# Patient Record
Sex: FEMALE | Race: OTHER
[De-identification: names, ages, dates, MRNs, and addresses within clinical notes are randomized per-mention and may not be internally consistent; named-entity substitution may affect disease eponyms.]

---

## 2018-06-26 ENCOUNTER — RESULT REVIEW (OUTPATIENT)
Age: 63
End: 2018-06-26

## 2019-04-27 ENCOUNTER — TRANSCRIPTION ENCOUNTER (OUTPATIENT)
Age: 64
End: 2019-04-27

## 2019-05-02 ENCOUNTER — RESULT REVIEW (OUTPATIENT)
Age: 64
End: 2019-05-02

## 2019-05-05 ENCOUNTER — TRANSCRIPTION ENCOUNTER (OUTPATIENT)
Age: 64
End: 2019-05-05

## 2019-05-19 ENCOUNTER — TRANSCRIPTION ENCOUNTER (OUTPATIENT)
Age: 64
End: 2019-05-19

## 2019-10-24 ENCOUNTER — FORM ENCOUNTER (OUTPATIENT)
Age: 64
End: 2019-10-24

## 2020-05-28 ENCOUNTER — FORM ENCOUNTER (OUTPATIENT)
Age: 65
End: 2020-05-28

## 2020-11-12 ENCOUNTER — FORM ENCOUNTER (OUTPATIENT)
Age: 65
End: 2020-11-12

## 2021-02-11 PROBLEM — Z00.00 ENCOUNTER FOR PREVENTIVE HEALTH EXAMINATION: Status: ACTIVE | Noted: 2021-02-11

## 2021-05-22 ENCOUNTER — TRANSCRIPTION ENCOUNTER (OUTPATIENT)
Age: 66
End: 2021-05-22

## 2021-05-28 ENCOUNTER — APPOINTMENT (OUTPATIENT)
Dept: BREAST CENTER | Facility: CLINIC | Age: 66
End: 2021-05-28

## 2021-06-14 ENCOUNTER — APPOINTMENT (OUTPATIENT)
Dept: BREAST CENTER | Facility: CLINIC | Age: 66
End: 2021-06-14
Payer: COMMERCIAL

## 2021-06-14 ENCOUNTER — NON-APPOINTMENT (OUTPATIENT)
Age: 66
End: 2021-06-14

## 2021-06-14 VITALS
DIASTOLIC BLOOD PRESSURE: 75 MMHG | OXYGEN SATURATION: 95 % | HEART RATE: 92 BPM | BODY MASS INDEX: 26.66 KG/M2 | SYSTOLIC BLOOD PRESSURE: 116 MMHG | HEIGHT: 65 IN | WEIGHT: 160 LBS

## 2021-06-14 DIAGNOSIS — Z78.9 OTHER SPECIFIED HEALTH STATUS: ICD-10-CM

## 2021-06-14 DIAGNOSIS — Z87.39 PERSONAL HISTORY OF OTHER DISEASES OF THE MUSCULOSKELETAL SYSTEM AND CONNECTIVE TISSUE: ICD-10-CM

## 2021-06-14 DIAGNOSIS — Z80.41 FAMILY HISTORY OF MALIGNANT NEOPLASM OF OVARY: ICD-10-CM

## 2021-06-14 DIAGNOSIS — Z80.3 FAMILY HISTORY OF MALIGNANT NEOPLASM OF BREAST: ICD-10-CM

## 2021-06-14 PROCEDURE — 99213 OFFICE O/P EST LOW 20 MIN: CPT

## 2021-06-14 PROCEDURE — 99072 ADDL SUPL MATRL&STAF TM PHE: CPT

## 2021-06-14 RX ORDER — ELECTROLYTES/DEXTROSE
SOLUTION, ORAL ORAL
Refills: 0 | Status: ACTIVE | COMMUNITY

## 2021-06-14 NOTE — PHYSICAL EXAM
[Normocephalic] : normocephalic [Atraumatic] : atraumatic [EOMI] : extra ocular movement intact [Supple] : supple [No Supraclavicular Adenopathy] : no supraclavicular adenopathy [No Cervical Adenopathy] : no cervical adenopathy [Examined in the supine and seated position] : examined in the supine and seated position [No dominant masses] : no dominant masses in right breast  [No dominant masses] : no dominant masses left breast [No Nipple Retraction] : no left nipple retraction [No Nipple Discharge] : no left nipple discharge [Breast Mass Right Breast ___cm] : no masses [Breast Mass Left Breast ___cm] : no masses [Breast Nipple Inversion] : nipples not inverted [Breast Nipple Retraction] : nipples not retracted [Breast Nipple Flattening] : nipples not flattened [Breast Nipple Fissures] : nipples not fissured [Breast Abnormal Lactation (Galactorrhea)] : no galactorrhea [Breast Abnormal Secretion Bloody Fluid] : no bloody discharge [Breast Abnormal Secretion Serous Fluid] : no serous discharge [Breast Abnormal Secretion Opalescent Fluid] : no milky discharge [No Axillary Lymphadenopathy] : no left axillary lymphadenopathy [No Edema] : no edema [No Rashes] : no rashes [No Ulceration] : no ulceration [de-identified] : On exam, the patient has C-cup breasts.  On palpation I cannot feel any suspicious densities in either breast.  She has no axillary, supraclavicular, or cervical adenopathy.

## 2021-06-14 NOTE — REASON FOR VISIT
[Follow-Up: _____] : a [unfilled] follow-up visit [FreeTextEntry1] : The patient comes in with a strong family history of breast cancer and a history of some benign prior left breast needle core biopsies.  She has a Annmarie model lifetime risk of 25% and was found to have an MUTYH mutation on genetic panel testing in June 2017.  She comes in for routine 6-month exams and gets yearly mammography and ultrasound

## 2021-06-14 NOTE — HISTORY OF PRESENT ILLNESS
[FreeTextEntry1] : The patient is a 65-year-old  postmenopausal white female.  She underwent menarche at age 13 and had her first child at age 30.  She has a family history of breast cancer with her sister who developed breast cancer at age 34 and is a patient of ours.  Her mother had ovarian cancer in her 50s.  The patient has a history of fibrocystic mastopathy and underwent a left breast 3:00 core biopsy in  performed by Dr. Mtz which showed benign adenosis and hyperplasia.  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  She underwent another left breast 3:00 MRI guided core biopsy at [facility] on 2016 showing a sclerosing lesion.  She underwent comprehensive BRCA testing in  which was negative and later had [employer] genetic testing in 2017 and was found to have an MUTYH mutation with a slight increased risk of colon cancer.  She has a Annmarie model lifetime risk of breast cancer of 25%.  She comes in for routine 6-month exam and gets yearly mammography and ultrasound.

## 2021-06-14 NOTE — ASSESSMENT
[FreeTextEntry1] : The patient is a 65-year-old  postmenopausal white female.  She underwent menarche at age 13 and had her first child at age 30.  She has a family history of breast cancer with her sister who developed breast cancer at age 34 and is a patient of ours.  Her mother had ovarian cancer in her 50s.  The patient has a history of fibrocystic mastopathy and underwent a left breast 3:00 core biopsy in  performed by Dr. Mtz which showed benign adenosis and hyperplasia.  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  She underwent another left breast 3:00 MRI guided core biopsy at [facility] on 2016 showing a sclerosing lesion.  She underwent comprehensive BRCA testing in  which was negative and later had [facility] my risk genetic testing in 2017 and was found to have an MUTYH mutation with a slight increased risk of colon cancer.  She has a Annmarie model lifetime risk of breast cancer of 25%.  On exam today, I cannot feel any suspicious densities in either breast.  She underwent a bilateral mammography and ultrasound at [facility] on 2021 which showed no suspicious findings.  She was reassured and should follow-up again in 6 months around 2021.  She can then follow-up yearly at that time and stagger her exams with her gynecologist, Dr. Way.  Her next mammo and ultrasound will be due in 2022.

## 2021-07-20 ENCOUNTER — RESULT REVIEW (OUTPATIENT)
Age: 66
End: 2021-07-20

## 2021-08-03 ENCOUNTER — RESULT REVIEW (OUTPATIENT)
Age: 66
End: 2021-08-03

## 2021-12-28 ENCOUNTER — APPOINTMENT (OUTPATIENT)
Dept: BREAST CENTER | Facility: CLINIC | Age: 66
End: 2021-12-28
Payer: COMMERCIAL

## 2021-12-28 VITALS — HEART RATE: 90 BPM | DIASTOLIC BLOOD PRESSURE: 82 MMHG | SYSTOLIC BLOOD PRESSURE: 118 MMHG | OXYGEN SATURATION: 95 %

## 2021-12-28 PROCEDURE — 99213 OFFICE O/P EST LOW 20 MIN: CPT

## 2021-12-28 NOTE — REASON FOR VISIT
[Follow-Up: _____] : a [unfilled] follow-up visit [FreeTextEntry1] : The patient comes in with a strong family history of breast cancer and a history of some benign prior left breast needle core biopsies.  She has a Annmarie model lifetime risk of 25% and was found to have an MUTYH mutation on genetic panel testing in June 2017.  She comes in for routine exams and gets yearly mammography and ultrasound

## 2021-12-28 NOTE — PHYSICAL EXAM
[Normocephalic] : normocephalic [Atraumatic] : atraumatic [EOMI] : extra ocular movement intact [Supple] : supple [No Supraclavicular Adenopathy] : no supraclavicular adenopathy [No Cervical Adenopathy] : no cervical adenopathy [Examined in the supine and seated position] : examined in the supine and seated position [No dominant masses] : no dominant masses in right breast  [No dominant masses] : no dominant masses left breast [No Nipple Retraction] : no left nipple retraction [No Nipple Discharge] : no left nipple discharge [Breast Mass Right Breast ___cm] : no masses [Breast Mass Left Breast ___cm] : no masses [No Axillary Lymphadenopathy] : no left axillary lymphadenopathy [No Edema] : no edema [No Rashes] : no rashes [No Ulceration] : no ulceration [Breast Nipple Inversion] : nipples not inverted [Breast Nipple Retraction] : nipples not retracted [Breast Nipple Flattening] : nipples not flattened [Breast Nipple Fissures] : nipples not fissured [Breast Abnormal Lactation (Galactorrhea)] : no galactorrhea [Breast Abnormal Secretion Bloody Fluid] : no bloody discharge [Breast Abnormal Secretion Serous Fluid] : no serous discharge [Breast Abnormal Secretion Opalescent Fluid] : no milky discharge [de-identified] : On exam, the patient has C-cup breasts.  On palpation I cannot feel any suspicious densities in either breast.  She has no axillary, supraclavicular, or cervical adenopathy.

## 2021-12-28 NOTE — HISTORY OF PRESENT ILLNESS
[FreeTextEntry1] : The patient is a 66-year-old  postmenopausal white female.  She underwent menarche at age 13 and had her first child at age 30.  She has a family history of breast cancer with her sister who developed breast cancer at age 34 and is a patient of ours.  Her mother had ovarian cancer in her 50s.  The patient has a history of fibrocystic mastopathy and underwent a left breast 3:00 core biopsy in  performed by Dr. Mtz which showed benign adenosis and hyperplasia.  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  She underwent another left breast 3:00 MRI guided core biopsy at [facility] on 2016 showing a sclerosing lesion.  She underwent comprehensive BRCA testing in  which was negative and later had [employer] genetic testing in 2017 and was found to have an MUTYH mutation with a slight increased risk of colon cancer.  She has a Annmarie model lifetime risk of breast cancer of 25%.  She comes in for routine 6-month exam and gets yearly mammography and ultrasound.

## 2021-12-28 NOTE — ASSESSMENT
[FreeTextEntry1] : The patient is a 66-year-old  postmenopausal white female.  She underwent menarche at age 13 and had her first child at age 30.  She has a family history of breast cancer with her sister who developed breast cancer at age 34 and is a patient of ours.  Her mother had ovarian cancer in her 50s.  The patient has a history of fibrocystic mastopathy and underwent a left breast 3:00 core biopsy in  performed by Dr. Mtz which showed benign adenosis and hyperplasia.  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  She underwent another left breast 3:00 MRI guided core biopsy at [facility] on 2016 showing a sclerosing lesion.  She underwent comprehensive BRCA testing in  which was negative and later had [employer] genetic testing in 2017 and was found to have an MUTYH mutation with a slight increased risk of colon cancer.  She has a Annmarie model lifetime risk of breast cancer of 25%.  On exam today, I cannot feel any suspicious densities in either breast.  She underwent a bilateral mammography and ultrasound at [facility] on 2021 which I reviewed and showed no suspicious findings.  She was reassured and should follow-up again in 2 months in 2022.  She can stagger her exams with her gynecologist, Dr. Way.  Her next mammo and ultrasound will be due in 2022 and she was given prescriptions.  She can then go to yearly follow-up after her next mammography and ultrasound if they show no suspicious findings.

## 2022-07-26 ENCOUNTER — RESULT REVIEW (OUTPATIENT)
Age: 67
End: 2022-07-26

## 2022-10-13 ENCOUNTER — NON-APPOINTMENT (OUTPATIENT)
Age: 67
End: 2022-10-13

## 2022-10-27 ENCOUNTER — NON-APPOINTMENT (OUTPATIENT)
Age: 67
End: 2022-10-27

## 2022-12-27 DIAGNOSIS — Z12.31 ENCOUNTER FOR SCREENING MAMMOGRAM FOR MALIGNANT NEOPLASM OF BREAST: ICD-10-CM

## 2022-12-28 ENCOUNTER — APPOINTMENT (OUTPATIENT)
Dept: BREAST CENTER | Facility: CLINIC | Age: 67
End: 2022-12-28
Payer: COMMERCIAL

## 2022-12-28 VITALS
SYSTOLIC BLOOD PRESSURE: 119 MMHG | BODY MASS INDEX: 24.96 KG/M2 | HEART RATE: 99 BPM | WEIGHT: 150 LBS | OXYGEN SATURATION: 95 % | DIASTOLIC BLOOD PRESSURE: 79 MMHG

## 2022-12-28 PROCEDURE — 99213 OFFICE O/P EST LOW 20 MIN: CPT

## 2022-12-28 NOTE — PHYSICAL EXAM
[Normocephalic] : normocephalic [Atraumatic] : atraumatic [EOMI] : extra ocular movement intact [Supple] : supple [No Supraclavicular Adenopathy] : no supraclavicular adenopathy [No Cervical Adenopathy] : no cervical adenopathy [Examined in the supine and seated position] : examined in the supine and seated position [No dominant masses] : no dominant masses in right breast  [No dominant masses] : no dominant masses left breast [No Nipple Retraction] : no left nipple retraction [No Nipple Discharge] : no left nipple discharge [Breast Mass Right Breast ___cm] : no masses [Breast Mass Left Breast ___cm] : no masses [No Axillary Lymphadenopathy] : no left axillary lymphadenopathy [No Edema] : no edema [No Rashes] : no rashes [No Ulceration] : no ulceration [Breast Nipple Inversion] : nipples not inverted [Breast Nipple Retraction] : nipples not retracted [Breast Nipple Flattening] : nipples not flattened [Breast Nipple Fissures] : nipples not fissured [Breast Abnormal Lactation (Galactorrhea)] : no galactorrhea [Breast Abnormal Secretion Bloody Fluid] : no bloody discharge [Breast Abnormal Secretion Serous Fluid] : no serous discharge [Breast Abnormal Secretion Opalescent Fluid] : no milky discharge [de-identified] : On exam, the patient has C-cup breasts.  On palpation I cannot feel any suspicious densities in either breast.  She has no axillary, supraclavicular, or cervical adenopathy.

## 2022-12-28 NOTE — ASSESSMENT
[FreeTextEntry1] : The patient is a 67-year-old  postmenopausal white female.  She underwent menarche at age 13 and had her first child at age 30.  She has a family history of breast cancer with her sister who developed breast cancer at age 34 and is a patient of ours.  Her mother had ovarian cancer in her 50s.  The patient has a history of fibrocystic mastopathy and underwent a left breast 3:00 core biopsy in  performed by Dr. Mtz which showed benign adenosis and hyperplasia.  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  She underwent another left breast 3:00 MRI guided core biopsy at [facility] on 2016 showing a sclerosing lesion.  She underwent comprehensive BRCA testing in  which was negative and later had [employer] genetic testing in 2017 and was found to have an MUTYH mutation with a slight increased risk of colon cancer.  She has a Annmarie model lifetime risk of breast cancer of 25%.  On exam today, I cannot feel any suspicious densities in either breast.  She underwent her last bilateral mammography and ultrasound at [facility] which was reviewed from 2022 showing no suspicious findings.  She was reassured and should follow-up again in 1 year in 2023.  She can stagger her exams with her gynecologist, Dr. Way.  Her next mammo and ultrasound will be due in 2023 and she was given prescriptions.

## 2022-12-28 NOTE — HISTORY OF PRESENT ILLNESS
[FreeTextEntry1] : The patient is a 67-year-old  postmenopausal white female.  She underwent menarche at age 13 and had her first child at age 30.  She has a family history of breast cancer with her sister who developed breast cancer at age 34 and is a patient of ours.  Her mother had ovarian cancer in her 50s.  The patient has a history of fibrocystic mastopathy and underwent a left breast 3:00 core biopsy in  performed by Dr. Mtz which showed benign adenosis and hyperplasia.  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  She underwent another left breast 3:00 MRI guided core biopsy at [facility] on 2016 showing a sclerosing lesion.  She underwent comprehensive BRCA testing in  which was negative and later had [employer] genetic testing in 2017 and was found to have an MUTYH mutation with a slight increased risk of colon cancer.  She has a Annmarie model lifetime risk of breast cancer of 25%.  She comes in for routine yearly exam and gets yearly mammography and ultrasound.

## 2023-12-27 ENCOUNTER — NON-APPOINTMENT (OUTPATIENT)
Age: 68
End: 2023-12-27

## 2023-12-28 ENCOUNTER — NON-APPOINTMENT (OUTPATIENT)
Age: 68
End: 2023-12-28

## 2023-12-28 NOTE — ASSESSMENT
[FreeTextEntry1] : The patient is a 68-year-old  postmenopausal white female. She underwent menarche at age 13 and had her first child at age 30. She has a family history of breast cancer with her sister who developed breast cancer at age 34 and is a patient of ours. Her mother had ovarian cancer in her 50s. The patient has a history of fibrocystic mastopathy and underwent a left breast 3:00 core biopsy in  performed by Dr. Mtz which showed benign adenosis and hyperplasia. 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 She underwent another left breast 3:00 MRI guided core biopsy at [facility] on 2016 showing a sclerosing lesion. She underwent comprehensive BRCA testing in  which was negative and later had [employer] genetic testing in 2017 and was found to have an MUTYH mutation with a slight increased risk of colon cancer. She has a Annmarie model lifetime risk of breast cancer of 25%. On exam today, I cannot feel any suspicious densities in either breast. She underwent her last bilateral mammography and ultrasound at [facility] which was reviewed from 2023 showing no suspicious findings. She was reassured and should follow-up again in 1 year in 2024. She can stagger her exams with her gynecologist, Dr. Way. Her next mammo and ultrasound will be due in 2024 and she was given prescriptions.

## 2023-12-28 NOTE — PHYSICAL EXAM
[Breast Nipple Inversion] : nipples not inverted [Breast Nipple Retraction] : nipples not retracted [Breast Nipple Flattening] : nipples not flattened [Breast Nipple Fissures] : nipples not fissured [Breast Abnormal Lactation (Galactorrhea)] : no galactorrhea [Breast Abnormal Secretion Bloody Fluid] : no bloody discharge [Breast Abnormal Secretion Serous Fluid] : no serous discharge [Breast Abnormal Secretion Opalescent Fluid] : no milky discharge [de-identified] : On exam, the patient has C-cup breasts.  On palpation I cannot feel any suspicious densities in either breast.  She has no axillary, supraclavicular, or cervical adenopathy.

## 2023-12-28 NOTE — HISTORY OF PRESENT ILLNESS
[FreeTextEntry1] : The patient is a 68-year-old  postmenopausal white female.  She underwent menarche at age 13 and had her first child at age 30.  She has a family history of breast cancer with her sister who developed breast cancer at age 34 and is a patient of ours.  Her mother had ovarian cancer in her 50s.  The patient has a history of fibrocystic mastopathy and underwent a left breast 3:00 core biopsy in  performed by Dr. Mtz which showed benign adenosis and hyperplasia.  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  She underwent another left breast 3:00 MRI guided core biopsy at [facility] on 2016 showing a sclerosing lesion.  She underwent comprehensive BRCA testing in  which was negative and later had [employer] genetic testing in 2017 and was found to have an MUTYH mutation with a slight increased risk of colon cancer.  She has a Annmarie model lifetime risk of breast cancer of 25%.  She comes in for routine yearly exam and gets yearly mammography and ultrasound.

## 2023-12-28 NOTE — REASON FOR VISIT
[FreeTextEntry1] : The patient comes in with a strong family history of breast cancer and a history of some benign prior left breast needle core biopsies.  She has a Annmarie model lifetime risk of 25% and was found to have an MUTYH mutation on genetic panel testing in June 2017.  She comes in for routine exams and gets yearly mammography and ultrasound

## 2024-01-02 DIAGNOSIS — R92.323 MAMMOGRAPHIC FIBROGLANDULAR DENSITY, BILATERAL BREASTS: ICD-10-CM

## 2024-01-04 ENCOUNTER — APPOINTMENT (OUTPATIENT)
Dept: BREAST CENTER | Facility: CLINIC | Age: 69
End: 2024-01-04
Payer: COMMERCIAL

## 2024-01-04 DIAGNOSIS — N60.12 DIFFUSE CYSTIC MASTOPATHY OF LEFT BREAST: ICD-10-CM

## 2024-01-04 DIAGNOSIS — Z15.89 GENETIC SUSCEPTIBILITY TO OTHER DISEASE: ICD-10-CM

## 2024-01-04 DIAGNOSIS — Z80.3 FAMILY HISTORY OF MALIGNANT NEOPLASM OF BREAST: ICD-10-CM

## 2024-01-04 DIAGNOSIS — Z80.41 FAMILY HISTORY OF MALIGNANT NEOPLASM OF OVARY: ICD-10-CM

## 2024-01-04 DIAGNOSIS — R92.30 DENSE BREASTS, UNSPECIFIED: ICD-10-CM

## 2024-01-04 DIAGNOSIS — N60.11 DIFFUSE CYSTIC MASTOPATHY OF RIGHT BREAST: ICD-10-CM

## 2024-01-04 DIAGNOSIS — Z87.39 PERSONAL HISTORY OF OTHER DISEASES OF THE MUSCULOSKELETAL SYSTEM AND CONNECTIVE TISSUE: ICD-10-CM

## 2024-01-04 DIAGNOSIS — Z12.39 ENCOUNTER FOR OTHER SCREENING FOR MALIGNANT NEOPLASM OF BREAST: ICD-10-CM

## 2024-01-04 PROCEDURE — 99213 OFFICE O/P EST LOW 20 MIN: CPT

## 2024-01-04 NOTE — PHYSICAL EXAM
[Normocephalic] : normocephalic [Atraumatic] : atraumatic [EOMI] : extra ocular movement intact [Supple] : supple [No Supraclavicular Adenopathy] : no supraclavicular adenopathy [No Cervical Adenopathy] : no cervical adenopathy [Examined in the supine and seated position] : examined in the supine and seated position [No dominant masses] : no dominant masses in right breast  [No dominant masses] : no dominant masses left breast [No Nipple Retraction] : no left nipple retraction [No Nipple Discharge] : no left nipple discharge [Breast Mass Right Breast ___cm] : no masses [Breast Mass Left Breast ___cm] : no masses [No Axillary Lymphadenopathy] : no left axillary lymphadenopathy [No Edema] : no edema [No Rashes] : no rashes [No Ulceration] : no ulceration [Breast Nipple Inversion] : nipples not inverted [Breast Nipple Retraction] : nipples not retracted [Breast Nipple Flattening] : nipples not flattened [Breast Nipple Fissures] : nipples not fissured [Breast Abnormal Lactation (Galactorrhea)] : no galactorrhea [Breast Abnormal Secretion Bloody Fluid] : no bloody discharge [Breast Abnormal Secretion Serous Fluid] : no serous discharge [Breast Abnormal Secretion Opalescent Fluid] : no milky discharge [de-identified] : On exam, the patient has C-cup breasts.  On palpation I cannot feel any suspicious densities in either breast.  She has no axillary, supraclavicular, or cervical adenopathy.

## 2024-01-04 NOTE — HISTORY OF PRESENT ILLNESS
[FreeTextEntry1] : The patient is a 68-year-old  postmenopausal white female.  She underwent menarche at age 13 and had her first child at age 30.  She has a family history of breast cancer with her sister who developed breast cancer at age 34 and is a patient of ours.  Her mother had ovarian cancer in her 50s.  The patient has a history of fibrocystic mastopathy and underwent a left breast 3:00 core biopsy in  performed by Dr. Mtz which showed benign adenosis and hyperplasia.  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  She underwent another left breast 3:00 MRI guided core biopsy at [facility] on 2016 showing a sclerosing lesion.  She underwent comprehensive BRCA testing in  which was negative and later had [employer] genetic testing in 2017 and was found to have an MUTYH mutation with a slight increased risk of colon cancer.  She has a Annmarie model lifetime risk of breast cancer of 25%.  She comes in for routine yearly exam and gets yearly mammography and ultrasound.

## 2024-01-04 NOTE — ASSESSMENT
[FreeTextEntry1] : The patient is a 68-year-old  postmenopausal white female. She underwent menarche at age 13 and had her first child at age 30. She has a family history of breast cancer with her sister who developed breast cancer at age 34 and is a patient of ours. Her mother had ovarian cancer in her 50s. The patient has a history of fibrocystic mastopathy and underwent a left breast 3:00 core biopsy in  performed by Dr. Mtz which showed benign adenosis and hyperplasia. 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 She underwent another left breast 3:00 MRI guided core biopsy at [facility] on 2016 showing a sclerosing lesion. She underwent comprehensive BRCA testing in  which was negative and later had [employer] genetic testing in 2017 and was found to have an MUTYH mutation with a slight increased risk of colon cancer. She has a Annmarie model lifetime risk of breast cancer of 25%. On exam today, I cannot feel any suspicious densities in either breast. She underwent her last bilateral mammography and ultrasound at [facility] which was reviewed from 2023 showing no suspicious findings with scattered fibroglandular density. She was reassured and should follow-up again in 1 year in 2025. She can stagger her exams with her gynecologist, Dr. Way. Her next mammo and ultrasound will be due in 2024 and she was given prescriptions.  Of note, the patient has been diagnosed with osteoporosis and has an appointment to see an endocrinologist regarding biphosphonate's in 2024.

## 2024-07-17 ENCOUNTER — NON-APPOINTMENT (OUTPATIENT)
Age: 69
End: 2024-07-17

## 2024-09-19 ENCOUNTER — TRANSCRIPTION ENCOUNTER (OUTPATIENT)
Age: 69
End: 2024-09-19

## 2024-12-19 ENCOUNTER — NON-APPOINTMENT (OUTPATIENT)
Age: 69
End: 2024-12-19

## 2025-01-08 ENCOUNTER — APPOINTMENT (OUTPATIENT)
Dept: BREAST CENTER | Facility: CLINIC | Age: 70
End: 2025-01-08
Payer: COMMERCIAL

## 2025-01-08 VITALS
HEIGHT: 65 IN | WEIGHT: 150 LBS | HEART RATE: 87 BPM | OXYGEN SATURATION: 97 % | BODY MASS INDEX: 24.99 KG/M2 | DIASTOLIC BLOOD PRESSURE: 78 MMHG | SYSTOLIC BLOOD PRESSURE: 120 MMHG

## 2025-01-08 DIAGNOSIS — N60.12 DIFFUSE CYSTIC MASTOPATHY OF LEFT BREAST: ICD-10-CM

## 2025-01-08 DIAGNOSIS — Z12.31 ENCOUNTER FOR SCREENING MAMMOGRAM FOR MALIGNANT NEOPLASM OF BREAST: ICD-10-CM

## 2025-01-08 DIAGNOSIS — Z12.39 ENCOUNTER FOR OTHER SCREENING FOR MALIGNANT NEOPLASM OF BREAST: ICD-10-CM

## 2025-01-08 DIAGNOSIS — R92.30 DENSE BREASTS, UNSPECIFIED: ICD-10-CM

## 2025-01-08 DIAGNOSIS — Z80.3 FAMILY HISTORY OF MALIGNANT NEOPLASM OF BREAST: ICD-10-CM

## 2025-01-08 DIAGNOSIS — Z80.41 FAMILY HISTORY OF MALIGNANT NEOPLASM OF OVARY: ICD-10-CM

## 2025-01-08 DIAGNOSIS — N60.11 DIFFUSE CYSTIC MASTOPATHY OF RIGHT BREAST: ICD-10-CM

## 2025-01-08 DIAGNOSIS — Z15.89 GENETIC SUSCEPTIBILITY TO OTHER DISEASE: ICD-10-CM

## 2025-01-08 PROCEDURE — 99213 OFFICE O/P EST LOW 20 MIN: CPT

## 2025-03-14 ENCOUNTER — TRANSCRIPTION ENCOUNTER (OUTPATIENT)
Age: 70
End: 2025-03-14

## 2025-04-29 ENCOUNTER — NON-APPOINTMENT (OUTPATIENT)
Age: 70
End: 2025-04-29

## 2025-04-30 ENCOUNTER — APPOINTMENT (OUTPATIENT)
Dept: COLORECTAL SURGERY | Facility: CLINIC | Age: 70
End: 2025-04-30
Payer: COMMERCIAL

## 2025-04-30 VITALS
SYSTOLIC BLOOD PRESSURE: 143 MMHG | HEART RATE: 96 BPM | BODY MASS INDEX: 24.99 KG/M2 | WEIGHT: 150 LBS | TEMPERATURE: 98.3 F | HEIGHT: 65 IN | OXYGEN SATURATION: 98 % | DIASTOLIC BLOOD PRESSURE: 82 MMHG

## 2025-04-30 DIAGNOSIS — R15.1 FECAL SMEARING: ICD-10-CM

## 2025-04-30 PROCEDURE — 99204 OFFICE O/P NEW MOD 45 MIN: CPT

## 2025-04-30 RX ORDER — ESTRADIOL 0.75 MG/1.25G
GEL, METERED TOPICAL
Refills: 0 | Status: ACTIVE | COMMUNITY

## 2025-04-30 RX ORDER — GABAPENTIN 300 MG/1
300 CAPSULE ORAL
Refills: 0 | Status: ACTIVE | COMMUNITY

## 2025-07-09 ENCOUNTER — APPOINTMENT (OUTPATIENT)
Dept: COLORECTAL SURGERY | Facility: CLINIC | Age: 70
End: 2025-07-09